# Patient Record
Sex: MALE | Race: WHITE | NOT HISPANIC OR LATINO | Employment: FULL TIME | RURAL
[De-identification: names, ages, dates, MRNs, and addresses within clinical notes are randomized per-mention and may not be internally consistent; named-entity substitution may affect disease eponyms.]

---

## 2022-03-24 ENCOUNTER — OFFICE VISIT (OUTPATIENT)
Dept: FAMILY MEDICINE CLINIC | Facility: CLINIC | Age: 37
End: 2022-03-24

## 2022-03-24 VITALS
DIASTOLIC BLOOD PRESSURE: 84 MMHG | BODY MASS INDEX: 29.9 KG/M2 | HEART RATE: 78 BPM | WEIGHT: 201.9 LBS | SYSTOLIC BLOOD PRESSURE: 132 MMHG | HEIGHT: 69 IN | OXYGEN SATURATION: 98 %

## 2022-03-24 DIAGNOSIS — K62.5 BRBPR (BRIGHT RED BLOOD PER RECTUM): ICD-10-CM

## 2022-03-24 DIAGNOSIS — R31.9 HEMATURIA, UNSPECIFIED TYPE: ICD-10-CM

## 2022-03-24 DIAGNOSIS — B19.20 HEPATITIS C VIRUS INFECTION WITHOUT HEPATIC COMA, UNSPECIFIED CHRONICITY: ICD-10-CM

## 2022-03-24 DIAGNOSIS — Z00.00 ANNUAL PHYSICAL EXAM: Primary | ICD-10-CM

## 2022-03-24 DIAGNOSIS — Z13.220 SCREENING CHOLESTEROL LEVEL: ICD-10-CM

## 2022-03-24 LAB
BILIRUB BLD-MCNC: NEGATIVE MG/DL
CLARITY, POC: CLEAR
COLOR UR: YELLOW
EXPIRATION DATE: ABNORMAL
GLUCOSE UR STRIP-MCNC: NEGATIVE MG/DL
KETONES UR QL: NEGATIVE
LEUKOCYTE EST, POC: NEGATIVE
Lab: ABNORMAL
NITRITE UR-MCNC: NEGATIVE MG/ML
PH UR: 5.5 [PH] (ref 5–8)
PROT UR STRIP-MCNC: NEGATIVE MG/DL
RBC # UR STRIP: ABNORMAL /UL
SP GR UR: 1.03 (ref 1–1.03)
UROBILINOGEN UR QL: NORMAL

## 2022-03-24 PROCEDURE — 3008F BODY MASS INDEX DOCD: CPT | Performed by: NURSE PRACTITIONER

## 2022-03-24 PROCEDURE — 2014F MENTAL STATUS ASSESS: CPT | Performed by: NURSE PRACTITIONER

## 2022-03-24 PROCEDURE — 99385 PREV VISIT NEW AGE 18-39: CPT | Performed by: NURSE PRACTITIONER

## 2022-03-24 PROCEDURE — 81003 URINALYSIS AUTO W/O SCOPE: CPT | Performed by: NURSE PRACTITIONER

## 2022-03-24 RX ORDER — BUPRENORPHINE HYDROCHLORIDE AND NALOXONE HYDROCHLORIDE DIHYDRATE 8; 2 MG/1; MG/1
TABLET SUBLINGUAL
COMMUNITY
Start: 2022-03-17 | End: 2023-04-06

## 2022-03-24 NOTE — PROGRESS NOTES
"Chief Complaint  HFU    Subjective          Brandon Maciel presents to [facility]  Annual exam:   -Is fasting.  Smokes 1.5 packs/day.  No alcohol use.  Past medical history of IV drug use and street drugs.  Currently on Suboxone and sees his Suboxone specialist every 2 weeks.  Does not really watch his diet.  Walks daily.  States no real past medical history.  No dizziness no passing out no headache no chest pain no chest pressure no shortness of breath no trouble breathing no urinary or bowel issues.    States he has been clean and sober for 19 months.  States he finally decided to become clean when he had an episode where he OD and had received 7 rounds of Narcan.    States diagnosed with hep C in 2020 and has not received treatment for it.  Needs referral to specialist for evaluation.    HFU: Went to [facility] member cells on 3/14/2022 due to bright red blood per rectum for 3 days only when having a bowel movement.  Only noticed blood on toilet paper.  CBC and CMP in the hospital were unremarkable occult blood test came back negative.  Likely hemorrhoid.  States he has not had any more episodes in the past 1 to 2 days.  Would like referral to GI for further evaluation.  No pain.  No constipation.  No abdominal pain.  No nausea vomiting diarrhea.      Objective   Vital Signs:   /84   Pulse 78   Ht 175.3 cm (69\")   Wt 91.6 kg (201 lb 14.4 oz)   SpO2 98%   BMI 29.82 kg/m²     Body mass index is 29.82 kg/m².    Review of Systems   Constitutional: Negative.    HENT: Negative.    Eyes: Negative.    Respiratory: Negative.    Cardiovascular: Negative.    Gastrointestinal: Negative.    Genitourinary: Negative for decreased urine volume, difficulty urinating, dysuria, frequency, hematuria, nocturia, testicular pain and urgency.   Musculoskeletal: Negative.    Skin: Negative for color change, rash, wound and bruise.   Neurological: Negative.    Hematological: Negative.  "   Psychiatric/Behavioral: Negative.        Past History:  Medical History: has no past medical history on file.   Surgical History: has no past surgical history on file.   Family History: family history is not on file.   Social History: reports that he has been smoking cigarettes. He has a 22.50 pack-year smoking history. He has never used smokeless tobacco.    PHQ-2 Depression Screening  Little interest or pleasure in doing things? 3-->nearly every day   Feeling down, depressed, or hopeless? 0-->not at all   PHQ-2 Total Score 3        PHQ-9 Depression Screening  Little interest or pleasure in doing things? 3-->nearly every day   Feeling down, depressed, or hopeless? 0-->not at all   Trouble falling or staying asleep, or sleeping too much? 0-->not at all   Feeling tired or having little energy? 0-->not at all   Poor appetite or overeating? 0-->not at all   Feeling bad about yourself - or that you are a failure or have let yourself or your family down? 0-->not at all   Trouble concentrating on things, such as reading the newspaper or watching television? 0-->not at all   Moving or speaking so slowly that other people could have noticed? Or the opposite - being so fidgety or restless that you have been moving around a lot more than usual? 0-->not at all   Thoughts that you would be better off dead, or of hurting yourself in some way? 0-->not at all   PHQ-9 Total Score 3   If you checked off any problems, how difficult have these problems made it for you to do your work, take care of things at home, or get along with other people? somewhat difficult     PHQ-9 Total Score: 3        Current Outpatient Medications:   •  buprenorphine-naloxone (SUBOXONE) 8-2 MG per SL tablet, , Disp: , Rfl:    (Not in a hospital admission)     Allergies: Patient has no known allergies.    Physical Exam  Constitutional:       Appearance: Normal appearance. He is normal weight.   HENT:      Head: Normocephalic.      Right Ear: Tympanic  membrane normal.      Left Ear: Tympanic membrane normal.      Nose: Nose normal.      Mouth/Throat:      Mouth: Mucous membranes are moist.      Pharynx: Oropharynx is clear.   Eyes:      Extraocular Movements: Extraocular movements intact.      Conjunctiva/sclera: Conjunctivae normal.      Pupils: Pupils are equal, round, and reactive to light.   Cardiovascular:      Rate and Rhythm: Normal rate and regular rhythm.      Heart sounds: Normal heart sounds.   Pulmonary:      Effort: Pulmonary effort is normal.      Breath sounds: Normal breath sounds.   Abdominal:      General: Abdomen is flat. Bowel sounds are normal.      Palpations: Abdomen is soft.   Genitourinary:     Comments: Denied exam  Musculoskeletal:         General: Normal range of motion.      Cervical back: Normal range of motion.   Skin:     General: Skin is warm.      Coloration: Skin is not jaundiced.   Neurological:      General: No focal deficit present.      Mental Status: He is alert and oriented to person, place, and time. Mental status is at baseline.   Psychiatric:         Attention and Perception: Attention and perception normal.         Mood and Affect: Mood normal.         Speech: Speech normal.         Behavior: Behavior normal. Behavior is cooperative.         Thought Content: Thought content normal.         Cognition and Memory: Cognition normal.         Judgment: Judgment normal.          Result Review :                   Assessment and Plan    Diagnoses and all orders for this visit:    1. Annual physical exam (Primary)  Assessment & Plan:  Fasting labs drawn, UA completed and showed blood, culture sent, call in 1-2 days for results. Return in 1-2 weeks for repeat UA.  Proper diet and exercise plan discussed and encouraged.  Follow-up with GI regarding blood per rectum and hepatitis C.  Education provided.  He will return to clinic or ED if bleeding occurs again.  Return to clinic or ED with any issues or concerns.    Orders:  -      TSH Rfx On Abnormal To Free T4; Future  -     POC Urinalysis Dipstick, Automated  -     TSH Rfx On Abnormal To Free T4    2. Screening cholesterol level  -     Lipid Panel; Future  -     Lipid Panel    3. BRBPR (bright red blood per rectum)  -     Ambulatory Referral to Gastroenterology    4. Hepatitis C virus infection without hepatic coma, unspecified chronicity  -     Ambulatory Referral to Gastroenterology            Follow Up   Return in about 3 months (around 6/24/2022).  Patient was given instructions and counseling regarding his condition or for health maintenance advice. Please see specific information pulled into the AVS if appropriate.     DONAVAN Pham

## 2022-03-24 NOTE — ASSESSMENT & PLAN NOTE
Fasting labs drawn, UA completed and showed blood, culture sent, call in 1-2 days for results. Return in 1-2 weeks for repeat UA.  Proper diet and exercise plan discussed and encouraged.  Follow-up with GI regarding blood per rectum and hepatitis C.  Education provided.  He will return to clinic or ED if bleeding occurs again.  Return to clinic or ED with any issues or concerns.

## 2022-03-25 LAB
CHOLEST SERPL-MCNC: 168 MG/DL (ref 100–199)
HDLC SERPL-MCNC: 50 MG/DL
LDLC SERPL CALC-MCNC: 87 MG/DL (ref 0–99)
TRIGL SERPL-MCNC: 181 MG/DL (ref 0–149)
VLDLC SERPL CALC-MCNC: 31 MG/DL (ref 5–40)

## 2022-03-26 LAB
BACTERIA UR CULT: NORMAL
BACTERIA UR CULT: NORMAL

## 2022-03-28 ENCOUNTER — TELEPHONE (OUTPATIENT)
Dept: FAMILY MEDICINE CLINIC | Facility: CLINIC | Age: 37
End: 2022-03-28

## 2022-03-28 NOTE — TELEPHONE ENCOUNTER
Please let patient know that his urine culture came back contaminated.  Can leave another urine sample if having any urinary issues.  Thanks

## 2022-03-29 ENCOUNTER — TELEPHONE (OUTPATIENT)
Dept: GASTROENTEROLOGY | Facility: CLINIC | Age: 37
End: 2022-03-29

## 2022-04-01 DIAGNOSIS — Z12.11 SCREENING FOR COLON CANCER: Primary | ICD-10-CM

## 2022-04-05 ENCOUNTER — OUTSIDE FACILITY SERVICE (OUTPATIENT)
Dept: GASTROENTEROLOGY | Facility: CLINIC | Age: 37
End: 2022-04-05

## 2022-04-05 PROCEDURE — 45378 DIAGNOSTIC COLONOSCOPY: CPT | Performed by: INTERNAL MEDICINE

## 2022-04-21 ENCOUNTER — TELEPHONE (OUTPATIENT)
Dept: FAMILY MEDICINE CLINIC | Facility: CLINIC | Age: 37
End: 2022-04-21

## 2022-04-21 NOTE — TELEPHONE ENCOUNTER
TSH was not collected when having labs drawn and its been to long to add on? Sent to Bradly to let him know so he can decide what to do.

## 2022-04-21 NOTE — TELEPHONE ENCOUNTER
Please let patient know that the lab was unable to run the TSH on him if he wants to come back by to have TSH level redrawn.  Thanks

## 2022-04-26 ENCOUNTER — LAB (OUTPATIENT)
Dept: FAMILY MEDICINE CLINIC | Facility: CLINIC | Age: 37
End: 2022-04-26

## 2022-04-26 PROCEDURE — 36415 COLL VENOUS BLD VENIPUNCTURE: CPT | Performed by: NURSE PRACTITIONER

## 2022-04-27 DIAGNOSIS — R79.89 LOW TSH LEVEL: Primary | ICD-10-CM

## 2022-04-27 LAB
T4 FREE SERPL-MCNC: 1.29 NG/DL (ref 0.82–1.77)
TSH SERPL DL<=0.005 MIU/L-ACNC: 0.43 UIU/ML (ref 0.45–4.5)

## 2022-04-28 ENCOUNTER — LAB (OUTPATIENT)
Dept: LAB | Facility: HOSPITAL | Age: 37
End: 2022-04-28

## 2022-04-28 ENCOUNTER — OFFICE VISIT (OUTPATIENT)
Dept: GASTROENTEROLOGY | Facility: CLINIC | Age: 37
End: 2022-04-28

## 2022-04-28 VITALS
DIASTOLIC BLOOD PRESSURE: 80 MMHG | TEMPERATURE: 97.1 F | WEIGHT: 200 LBS | HEIGHT: 69 IN | OXYGEN SATURATION: 95 % | SYSTOLIC BLOOD PRESSURE: 120 MMHG | HEART RATE: 75 BPM | BODY MASS INDEX: 29.62 KG/M2

## 2022-04-28 DIAGNOSIS — B18.2 CHRONIC HEPATITIS C WITHOUT HEPATIC COMA: Primary | ICD-10-CM

## 2022-04-28 DIAGNOSIS — B18.2 CHRONIC HEPATITIS C WITHOUT HEPATIC COMA: ICD-10-CM

## 2022-04-28 LAB
ALBUMIN SERPL-MCNC: 4.4 G/DL (ref 3.5–5.2)
ALBUMIN/GLOB SERPL: 2 G/DL
ALP SERPL-CCNC: 76 U/L (ref 39–117)
ALT SERPL W P-5'-P-CCNC: 40 U/L (ref 1–41)
AMPHET+METHAMPHET UR QL: NEGATIVE
AMPHETAMINES UR QL: NEGATIVE
ANION GAP SERPL CALCULATED.3IONS-SCNC: 14.3 MMOL/L (ref 5–15)
AST SERPL-CCNC: 29 U/L (ref 1–40)
BARBITURATES UR QL SCN: NEGATIVE
BASOPHILS # BLD AUTO: 0.03 10*3/MM3 (ref 0–0.2)
BASOPHILS NFR BLD AUTO: 0.4 % (ref 0–1.5)
BENZODIAZ UR QL SCN: NEGATIVE
BILIRUB SERPL-MCNC: 0.2 MG/DL (ref 0–1.2)
BUN SERPL-MCNC: 11 MG/DL (ref 6–20)
BUN/CREAT SERPL: 13.1 (ref 7–25)
BUPRENORPHINE SERPL-MCNC: POSITIVE NG/ML
CALCIUM SPEC-SCNC: 9.1 MG/DL (ref 8.6–10.5)
CANNABINOIDS SERPL QL: POSITIVE
CHLORIDE SERPL-SCNC: 105 MMOL/L (ref 98–107)
CO2 SERPL-SCNC: 22.7 MMOL/L (ref 22–29)
COCAINE UR QL: NEGATIVE
CREAT SERPL-MCNC: 0.84 MG/DL (ref 0.76–1.27)
DEPRECATED RDW RBC AUTO: 40.1 FL (ref 37–54)
EGFRCR SERPLBLD CKD-EPI 2021: 116.6 ML/MIN/1.73
EOSINOPHIL # BLD AUTO: 0.42 10*3/MM3 (ref 0–0.4)
EOSINOPHIL NFR BLD AUTO: 5.4 % (ref 0.3–6.2)
ERYTHROCYTE [DISTWIDTH] IN BLOOD BY AUTOMATED COUNT: 12.2 % (ref 12.3–15.4)
GLOBULIN UR ELPH-MCNC: 2.2 GM/DL
GLUCOSE SERPL-MCNC: 96 MG/DL (ref 65–99)
HBV SURFACE AG SERPL QL IA: NORMAL
HCT VFR BLD AUTO: 44.9 % (ref 37.5–51)
HCV AB SER DONR QL: REACTIVE
HGB BLD-MCNC: 15.4 G/DL (ref 13–17.7)
HIV1+2 AB SER QL: NORMAL
IMM GRANULOCYTES # BLD AUTO: 0.01 10*3/MM3 (ref 0–0.05)
IMM GRANULOCYTES NFR BLD AUTO: 0.1 % (ref 0–0.5)
INR PPP: 0.99 (ref 0.84–1.13)
LYMPHOCYTES # BLD AUTO: 2.58 10*3/MM3 (ref 0.7–3.1)
LYMPHOCYTES NFR BLD AUTO: 33.1 % (ref 19.6–45.3)
MCH RBC QN AUTO: 31 PG (ref 26.6–33)
MCHC RBC AUTO-ENTMCNC: 34.3 G/DL (ref 31.5–35.7)
MCV RBC AUTO: 90.3 FL (ref 79–97)
METHADONE UR QL SCN: NEGATIVE
MONOCYTES # BLD AUTO: 0.68 10*3/MM3 (ref 0.1–0.9)
MONOCYTES NFR BLD AUTO: 8.7 % (ref 5–12)
NEUTROPHILS NFR BLD AUTO: 4.08 10*3/MM3 (ref 1.7–7)
NEUTROPHILS NFR BLD AUTO: 52.3 % (ref 42.7–76)
NRBC BLD AUTO-RTO: 0 /100 WBC (ref 0–0.2)
OPIATES UR QL: NEGATIVE
OXYCODONE UR QL SCN: NEGATIVE
PCP UR QL SCN: NEGATIVE
PLATELET # BLD AUTO: 220 10*3/MM3 (ref 140–450)
PMV BLD AUTO: 11.9 FL (ref 6–12)
POTASSIUM SERPL-SCNC: 4.1 MMOL/L (ref 3.5–5.2)
PROPOXYPH UR QL: NEGATIVE
PROT SERPL-MCNC: 6.6 G/DL (ref 6–8.5)
PROTHROMBIN TIME: 13 SECONDS (ref 11.4–14.4)
RBC # BLD AUTO: 4.97 10*6/MM3 (ref 4.14–5.8)
SODIUM SERPL-SCNC: 142 MMOL/L (ref 136–145)
TRICYCLICS UR QL SCN: NEGATIVE
WBC NRBC COR # BLD: 7.8 10*3/MM3 (ref 3.4–10.8)

## 2022-04-28 PROCEDURE — 80053 COMPREHEN METABOLIC PANEL: CPT

## 2022-04-28 PROCEDURE — 36415 COLL VENOUS BLD VENIPUNCTURE: CPT

## 2022-04-28 PROCEDURE — 87340 HEPATITIS B SURFACE AG IA: CPT

## 2022-04-28 PROCEDURE — 85610 PROTHROMBIN TIME: CPT

## 2022-04-28 PROCEDURE — 85025 COMPLETE CBC W/AUTO DIFF WBC: CPT

## 2022-04-28 PROCEDURE — 87522 HEPATITIS C REVRS TRNSCRPJ: CPT

## 2022-04-28 PROCEDURE — 86803 HEPATITIS C AB TEST: CPT

## 2022-04-28 PROCEDURE — 80306 DRUG TEST PRSMV INSTRMNT: CPT

## 2022-04-28 PROCEDURE — 99214 OFFICE O/P EST MOD 30 MIN: CPT | Performed by: NURSE PRACTITIONER

## 2022-04-28 PROCEDURE — 86704 HEP B CORE ANTIBODY TOTAL: CPT

## 2022-04-28 PROCEDURE — 81596 NFCT DS CHRNC HCV 6 ASSAYS: CPT

## 2022-04-28 PROCEDURE — 86708 HEPATITIS A ANTIBODY: CPT

## 2022-04-28 PROCEDURE — G0432 EIA HIV-1/HIV-2 SCREEN: HCPCS

## 2022-04-28 PROCEDURE — 87902 NFCT AGT GNTYP ALYS HEP C: CPT

## 2022-04-28 RX ORDER — POLYETHYLENE GLYCOL-3350 AND ELECTROLYTES WITH FLAVOR PACK 240; 5.84; 2.98; 6.72; 22.72 G/278.26G; G/278.26G; G/278.26G; G/278.26G; G/278.26G
POWDER, FOR SOLUTION ORAL
COMMUNITY
Start: 2022-04-01 | End: 2022-05-19

## 2022-04-28 NOTE — PROGRESS NOTES
New Patient Consultation     Patient Name: Brandon Maciel  : 1985   MRN: [id number]     Chief Complaint:    Chief Complaint   Patient presents with   • Abnormal Lab     HEP C       History of Present Illness: Brandon Maciel is a 36 y.o. male who is here today for a Gastroenterology Consultation for Hepatitis C.    Dx with Hep C in . Currently in a suboxone clinic.  Hx of Iv drug use (heroin and meth).  Sober since .  Has been to longterm. There is a history of unprotected intercourse.He has never had treatment.  There is no history of liver related hospitalization.  He does not recall ever having Hepatitis A or B.  He denies jaundice, joint swelling, fatigue, easy bruising/ bleeding, confusion/  There is no family hx of liver disease  He does not drink alcohol.    Subjective      Review of Systems:   Review of Systems   Constitutional: Negative for fatigue and unexpected weight loss.   Cardiovascular: Negative for leg swelling.   Gastrointestinal: Negative for abdominal distention.   Musculoskeletal: Negative for joint swelling.   Skin: Negative for color change, pallor and rash.   Neurological: Negative for confusion.       Past Medical History:   Past Medical History:   Diagnosis Date   • Infectious viral hepatitis     Found out at rehab treatment that i have Hepatitis C and its active       Past Surgical History:   Past Surgical History:   Procedure Laterality Date   • COLONOSCOPY  2022       Family History:   Family History   Problem Relation Age of Onset   • Colon cancer Neg Hx    • Colon polyps Neg Hx    • Esophageal cancer Neg Hx        Social History:   Social History     Socioeconomic History   • Marital status: Single   Tobacco Use   • Smoking status: Current Every Day Smoker     Packs/day: 1.50     Years: 15.00     Pack years: 22.50     Types: Cigarettes   • Smokeless tobacco: Never Used   Vaping Use   • Vaping Use: Never used   Substance and Sexual Activity  "  • Alcohol use: Never   • Drug use: Yes     Frequency: 7.0 times per week     Types: Heroin, Hydrocodone, Marijuana, Methamphetamines   • Sexual activity: Yes     Partners: Female     Birth control/protection: Condom, I.U.D.       Alcohol/Tobacco History:   Social History     Substance and Sexual Activity   Alcohol Use Never     Social History     Tobacco Use   Smoking Status Current Every Day Smoker   • Packs/day: 1.50   • Years: 15.00   • Pack years: 22.50   • Types: Cigarettes   Smokeless Tobacco Never Used       Medications:     Current Outpatient Medications:   •  buprenorphine-naloxone (SUBOXONE) 8-2 MG per SL tablet, , Disp: , Rfl:   •  GaviLyte-C 240 g solution, , Disp: , Rfl:     Allergies:   No Known Allergies    Objective     Physical Exam:  Vital Signs:   Vitals:    04/28/22 1229   BP: 120/80   BP Location: Left arm   Patient Position: Sitting   Cuff Size: Adult   Pulse: 75   Temp: 97.1 °F (36.2 °C)   TempSrc: Temporal   SpO2: 95%   Weight: 90.7 kg (200 lb)   Height: 175.3 cm (69.02\")     Body mass index is 29.52 kg/m².     Physical Exam  Vitals and nursing note reviewed.   Constitutional:       General: He is not in acute distress.     Appearance: He is well-developed. He is not diaphoretic.   Eyes:      General: No scleral icterus.     Conjunctiva/sclera: Conjunctivae normal.   Neck:      Thyroid: No thyromegaly.   Cardiovascular:      Rate and Rhythm: Normal rate and regular rhythm.   Pulmonary:      Effort: Pulmonary effort is normal.      Breath sounds: Normal breath sounds.   Abdominal:      General: Bowel sounds are normal. There is no distension.      Palpations: Abdomen is soft. There is no hepatomegaly.      Tenderness: There is no abdominal tenderness. There is no guarding or rebound.      Hernia: No hernia is present.   Musculoskeletal:      Cervical back: Neck supple.      Right lower leg: No edema.      Left lower leg: No edema.   Skin:     General: Skin is warm and dry.      Capillary " Refill: Capillary refill takes 2 to 3 seconds.      Coloration: Skin is not jaundiced or pale.      Findings: No bruising or petechiae.      Nails: There is no clubbing.   Neurological:      Mental Status: He is alert and oriented to person, place, and time.   Psychiatric:         Behavior: Behavior normal.         Thought Content: Thought content normal.         Judgment: Judgment normal.         Assessment / Plan      Assessment/Plan:   Diagnoses and all orders for this visit:    1. Chronic hepatitis C without hepatic coma (HCC) (Primary)  -     HCV FibroSURE; Future  -     Urine Drug Screen - Urine, Clean Catch; Future  -     Hepatitis A Antibody, Total; Future  -     Hepatitis B Core Antibody, Total; Future  -     Hepatitis B Surface Antigen; Future  -     Protime-INR; Future  -     Comprehensive Metabolic Panel; Future  -     CBC & Differential; Future  -     HIV-1 & HIV-2 Antibodies; Future  -     Hepatitis C Genotype; Future  -     Hepatitis C Antibody; Future  -     Hepatitis C RNA, Quantitative, PCR (graph); Future     -Outside records reviewed  -diagnosed 2020  -HCV risk factors as discussed in HPI  -treatment naive  -Discussed the nature, workup, and transmission of Hepatitis C virus; advised to avoid sharing toothbrush, dental and shaving equipment, nail clippers  -Recommend vaccination series for Hepatitis A and B.  -Labs per HCV consultation protocol- will plan to initiate epclusa, 12 week course once labs result. Medication directions given, common side effects discussed.         Follow Up:   Return in about 4 months (around 8/28/2022).    Plan of care reviewed with the patient at the conclusion of today's visit.  Education was provided regarding diagnosis, management, and any prescribed or recommended OTC medications.  Patient verbalized understanding of and agreement with management plan.         DONAVAN Wright  [facility]

## 2022-04-29 LAB
HAV AB SER QL IA: NEGATIVE
HBV CORE AB SERPL QL IA: NEGATIVE

## 2022-05-01 LAB
A2 MACROGLOB SERPL-MCNC: 123 MG/DL (ref 110–276)
ALT SERPL W P-5'-P-CCNC: 43 IU/L (ref 0–55)
APO A-I SERPL-MCNC: 145 MG/DL (ref 101–178)
BILIRUB SERPL-MCNC: 0.2 MG/DL (ref 0–1.2)
FIBROSIS SCORING:: ABNORMAL
FIBROSIS STAGE SERPL QL: ABNORMAL
GGT SERPL-CCNC: 33 IU/L (ref 0–65)
HAPTOGLOB SERPL-MCNC: 60 MG/DL (ref 17–317)
HCV AB SER QL: ABNORMAL
LABORATORY COMMENT REPORT: ABNORMAL
LIVER FIBR SCORE SERPL CALC.FIBROSURE: 0.06 (ref 0–0.21)
NECROINFLAMM ACTIVITY SCORING:: ABNORMAL
NECROINFLAMMATORY ACT GRADE SERPL QL: ABNORMAL
NECROINFLAMMATORY ACT SCORE SERPL: 0.18 (ref 0–0.17)
SERVICE CMNT-IMP: ABNORMAL

## 2022-05-02 LAB
HCV GENTYP SERPL NAA+PROBE: 3
LABORATORY COMMENT REPORT: NORMAL

## 2022-05-09 LAB
HCV RNA SERPL NAA+PROBE-ACNC: NORMAL IU/ML
HCV RNA SERPL NAA+PROBE-LOG IU: 5.97 LOG10 IU/ML
TEST INFORMATION: NORMAL

## 2022-05-10 RX ORDER — VELPATASVIR AND SOFOSBUVIR 100; 400 MG/1; MG/1
1 TABLET, FILM COATED ORAL DAILY
Qty: 30 TABLET | Refills: 2 | Status: SHIPPED | OUTPATIENT
Start: 2022-05-10 | End: 2022-05-19

## 2022-05-12 ENCOUNTER — TELEPHONE (OUTPATIENT)
Dept: GASTROENTEROLOGY | Facility: CLINIC | Age: 37
End: 2022-05-12

## 2022-05-16 NOTE — TELEPHONE ENCOUNTER
Gerald called stating that [facility] is showing a different insurance for primary - talk to patient gave him gerald ph# [phone]

## 2022-05-19 ENCOUNTER — LAB (OUTPATIENT)
Dept: LAB | Facility: HOSPITAL | Age: 37
End: 2022-05-19

## 2022-05-19 ENCOUNTER — OFFICE VISIT (OUTPATIENT)
Dept: ENDOCRINOLOGY | Facility: CLINIC | Age: 37
End: 2022-05-19

## 2022-05-19 VITALS
BODY MASS INDEX: 28.88 KG/M2 | HEIGHT: 69 IN | OXYGEN SATURATION: 97 % | SYSTOLIC BLOOD PRESSURE: 120 MMHG | HEART RATE: 80 BPM | WEIGHT: 195 LBS | DIASTOLIC BLOOD PRESSURE: 80 MMHG

## 2022-05-19 DIAGNOSIS — E05.90 SUBCLINICAL HYPERTHYROIDISM: Primary | ICD-10-CM

## 2022-05-19 LAB
T3FREE SERPL-MCNC: 4.18 PG/ML (ref 2–4.4)
T4 FREE SERPL-MCNC: 1.26 NG/DL (ref 0.93–1.7)
TSH SERPL DL<=0.05 MIU/L-ACNC: 0.63 UIU/ML (ref 0.27–4.2)

## 2022-05-19 PROCEDURE — 99203 OFFICE O/P NEW LOW 30 MIN: CPT | Performed by: INTERNAL MEDICINE

## 2022-05-19 PROCEDURE — 84443 ASSAY THYROID STIM HORMONE: CPT | Performed by: INTERNAL MEDICINE

## 2022-05-19 PROCEDURE — 84445 ASSAY OF TSI GLOBULIN: CPT | Performed by: INTERNAL MEDICINE

## 2022-05-19 PROCEDURE — 83520 IMMUNOASSAY QUANT NOS NONAB: CPT | Performed by: INTERNAL MEDICINE

## 2022-05-19 PROCEDURE — 84481 FREE ASSAY (FT-3): CPT | Performed by: INTERNAL MEDICINE

## 2022-05-19 PROCEDURE — 84439 ASSAY OF FREE THYROXINE: CPT | Performed by: INTERNAL MEDICINE

## 2022-05-19 NOTE — TELEPHONE ENCOUNTER
"PER BIOPLUS --  We are still receiving a rejection from the patient's insurance stating to \"SUBMIT BILL TO OTHER PROCESSOR OR PRIMARY PAYOR\". We have spoken with the patient and advised that he will need to reach back out to his plan to have his insurance plan updated. His [employer] plan's phone number is [phone] should you need it.    LVM FOR PATIENT TO CALL BACK"

## 2022-05-19 NOTE — PROGRESS NOTES
Chief Complaint   Patient presents with   • Thyroid Problem     Low TSH        New patient who is being seen in consultation regarding low TSH at the request of Bradly Lopez AP*     HPI   Brandon Maciel is a 36 y.o. male who presents for evaluation of low TSH.     Patient presents today for evaluation of low TSH which was diagnosed on routine labs when patient presented to establish care with a new primary care provider.  Patient denies any known history of thyroid dysfunction.  He is not currently taking any medication to alter thyroid function.  He reports that he generally feels well and denies any acute illness at the time of initial laboratory evaluation.     Patient denies palpiltations, anxiety.  Patient denies changes in bowel habits. Patient reports some constipation which he attributes to Suboxone therapy  Patient denies heat or cold intolerance.  Patient denies changes in weight.  Patient denies hair, skin or nail changes.  Patient denies tremor.  Patient reports fluctuation in energy level.    Patient denies history of previous head/neck radiation.  Patient denies history of recent iodine exposure.  Patient denies taking OTC supplements such as biotin.  Patient denies personal or family history of thyroid cancer.     Past Medical History:   Diagnosis Date   • Infectious viral hepatitis 2020    Found out at rehab treatment that i have Hepatitis C and its active   • Testosterone deficiency      Past Surgical History:   Procedure Laterality Date   • COLONOSCOPY  April 5 2022      Family History   Problem Relation Age of Onset   • Alcohol abuse Mother    • Alcohol abuse Father    • Heart disease Maternal Uncle    • Stroke Maternal Uncle    • Colon cancer Neg Hx    • Colon polyps Neg Hx    • Esophageal cancer Neg Hx       Social History     Socioeconomic History   • Marital status: Single   Tobacco Use   • Smoking status: Current Every Day Smoker     Packs/day: 1.50     Years: 15.00     Pack  "years: 22.50     Types: Cigarettes   • Smokeless tobacco: Never Used   Vaping Use   • Vaping Use: Never used   Substance and Sexual Activity   • Alcohol use: Never   • Drug use: Yes     Frequency: 7.0 times per week     Types: Heroin, Hydrocodone, Marijuana, Methamphetamines   • Sexual activity: Yes     Partners: Female     Birth control/protection: Condom, I.U.D.      No Known Allergies   Current Outpatient Medications on File Prior to Visit   Medication Sig Dispense Refill   • buprenorphine-naloxone (SUBOXONE) 8-2 MG per SL tablet      • [DISCONTINUED] GaviLyte-C 240 g solution      • [DISCONTINUED] Sofosbuvir-Velpatasvir 400-100 MG tablet Take 1 tablet by mouth Daily. For 12 weeks 30 tablet 2     No current facility-administered medications on file prior to visit.        Review of Systems   Constitutional: Positive for diaphoresis. Negative for unexpected weight gain and unexpected weight loss.   HENT: Negative for trouble swallowing and voice change.    Eyes: Negative for pain and visual disturbance.   Respiratory: Negative for cough and shortness of breath.    Cardiovascular: Negative for palpitations and leg swelling.   Gastrointestinal: Positive for abdominal pain and constipation.   Endocrine: Negative for cold intolerance and heat intolerance.   Musculoskeletal: Positive for back pain. Negative for myalgias.   Skin: Negative for dry skin and rash.   Neurological: Negative for tremors and headache.   Psychiatric/Behavioral: Negative for sleep disturbance. The patient is not nervous/anxious.         Vitals:    05/19/22 0825   BP: 120/80   BP Location: Right arm   Patient Position: Sitting   Cuff Size: Adult   Pulse: 80   SpO2: 97%   Weight: 88.5 kg (195 lb)   Height: 175.3 cm (69\")   Body mass index is 28.8 kg/m².     Physical Exam  Vitals reviewed.   Constitutional:       General: He is not in acute distress.     Appearance: Normal appearance.   HENT:      Head: Normocephalic and atraumatic.      Right Ear: " Hearing normal.      Left Ear: Hearing normal.      Nose: Nose normal.   Eyes:      General: Lids are normal.      Conjunctiva/sclera: Conjunctivae normal.   Neck:      Thyroid: No thyromegaly or thyroid tenderness.   Cardiovascular:      Rate and Rhythm: Normal rate and regular rhythm.      Heart sounds: No murmur heard.  Pulmonary:      Effort: Pulmonary effort is normal.      Breath sounds: Normal breath sounds and air entry.   Abdominal:      General: Bowel sounds are normal.      Palpations: Abdomen is soft.      Tenderness: There is no abdominal tenderness.   Lymphadenopathy:      Head:      Right side of head: No submandibular adenopathy.      Left side of head: No submandibular adenopathy.      Cervical: No cervical adenopathy.   Skin:     General: Skin is warm and dry.      Findings: No rash.   Neurological:      General: No focal deficit present.      Mental Status: He is alert.      Deep Tendon Reflexes: Reflexes are normal and symmetric.   Psychiatric:         Mood and Affect: Mood and affect normal.         Behavior: Behavior is cooperative.          Labs/Imaging      Latest Reference Range & Units 04/26/22 10:01   TSH Baseline 0.450 - 4.500 uIU/mL 0.428 (L)   Free T4 0.82 - 1.77 ng/dL 1.29   (L): Data is abnormally low    Assessment and Plan    Diagnoses and all orders for this visit:    1. Subclinical hyperthyroidism (Primary)  -Patient noted to have slightly low TSH, normal free T4 on labs in April 2022 with new PCP.  -Patient is clinically euthyroid  -Reviewed potential etiologies of patient's thyroid hormone abnormality.  Discussed that such abnormalities may be transient.  Plan to repeat thyroid function testing today, will also obtain thyroid antibodies.  -Discussed threshold for treatment of subclinical hyperthyroidism in patients that are asymptomatic.  - Potential risks of untreated hyperthyroidism were discussed with patient.  -Reviewed use of methimazole in the treatment of hyperthyroidism.   The risks and benefits of methimazole and when to seek care were reviewed with the patient. Potential risks include, but are not limited to, hepatic dysfunction, agranulocytosis, rash, vasculitis, iatrogenic hypothyroidism.  -Determine next steps after review of labs  -     TSH  -     T4, Free  -     T3, Free  -     Thyroid Stimulating Immunoglobulin  -     Thyrotropin Receptor Antibody         Return in about 4 months (around 9/19/2022) for Next scheduled follow up. The patient was instructed to contact the clinic with any interval questions or concerns.    Jessica Pacheco MD     Dictated Utilizing Dragon Dictation

## 2022-05-21 LAB — TSI SER-ACNC: <0.1 IU/L (ref 0–0.55)

## 2022-05-22 LAB — TSH RECEP AB SER-ACNC: <1.1 IU/L (ref 0–1.75)

## 2022-05-24 DIAGNOSIS — B19.20 HEPATITIS C VIRUS INFECTION WITHOUT HEPATIC COMA, UNSPECIFIED CHRONICITY: Primary | ICD-10-CM

## 2022-05-24 RX ORDER — VELPATASVIR AND SOFOSBUVIR 100; 400 MG/1; MG/1
1 TABLET, FILM COATED ORAL DAILY
Qty: 30 TABLET | Refills: 2 | Status: SHIPPED | OUTPATIENT
Start: 2022-05-24 | End: 2022-08-30

## 2022-08-30 ENCOUNTER — OFFICE VISIT (OUTPATIENT)
Dept: GASTROENTEROLOGY | Facility: CLINIC | Age: 37
End: 2022-08-30

## 2022-08-30 DIAGNOSIS — B18.2 CHRONIC HEPATITIS C WITHOUT HEPATIC COMA: Primary | ICD-10-CM

## 2022-08-30 PROCEDURE — 99213 OFFICE O/P EST LOW 20 MIN: CPT | Performed by: NURSE PRACTITIONER

## 2022-08-30 NOTE — PROGRESS NOTES
Follow Up      Patient Name: Brandon Maciel  : 1985   MRN: [id number]   You have chosen to receive care through a telephone visit. Do you consent to use a telephone visit for your medical care today? Yes   Both parties are located in the state of [place] at the time of the visit.    Chief Complaint:    Chief Complaint   Patient presents with   • Hepatic Disease       History of Present Illness: Brandon Maciel is a 37 y.o. male who is here today for follow up on Hepatitis C.     Brandon is here today to follow-up on hepatitis C.  He was diagnosed with 2020 and underwent treatment this year.  He completed his Epclusa about 2 weeks ago.  He did ultimately miss 2 doses but took them at the end of treatment.  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  He follows precautions to prevent transmission to others.  He has not been vaccinated for hepatitis A or B.    Subjective      Review of Systems:   Review of Systems   Constitutional: Positive for fatigue. Negative for unexpected weight loss.   Cardiovascular: Negative for leg swelling.   Gastrointestinal: Negative for abdominal distention.   Musculoskeletal: Negative for joint swelling.   Skin: Negative for color change, pallor and rash.   Neurological: Negative for confusion.       Medications:     Current Outpatient Medications:   •  buprenorphine-naloxone (SUBOXONE) 8-2 MG per SL tablet, , Disp: , Rfl:     Allergies:   No Known Allergies    Social History:   Social History     Socioeconomic History   • Marital status: Single   Tobacco Use   • Smoking status: Current Every Day Smoker     Packs/day: 1.50     Years: 15.00     Pack years: 22.50     Types: Cigarettes   • Smokeless tobacco: Never Used   Vaping Use   • Vaping Use: Never used   Substance and Sexual Activity   • Alcohol use: Never   • Drug use: Yes     Frequency: 7.0 times per week     Types: Heroin, Hydrocodone, Marijuana, Methamphetamines   • Sexual activity: Yes     Partners: Female     Birth  control/protection: Condom, I.U.D.        Surgical History:   Past Surgical History:   Procedure Laterality Date   • COLONOSCOPY  April 5 2022        Medical History:   Past Medical History:   Diagnosis Date   • Infectious viral hepatitis 2020    Found out at rehab treatment that i have Hepatitis C and its active   • Testosterone deficiency         Objective     Physical Exam:  Vital Signs: There were no vitals filed for this visit.  There is no height or weight on file to calculate BMI.     Physical Exam  Neurological:      Mental Status: He is oriented to person, place, and time.   Psychiatric:         Mood and Affect: Mood normal.         Thought Content: Thought content normal.         Assessment / Plan      Assessment/Plan:   Diagnoses and all orders for this visit:    1. Chronic hepatitis C without hepatic coma (HCC) (Primary)  -     Hepatitis C RNA, Quantitative, PCR (graph); Standing  -     Comprehensive Metabolic Panel; Standing    Status post treatment.  Will obtain labs today to ensure liver enzyme normalization and treatment response.  Continue to follow precautions to prevent transmission to others.  He will repeat these labs again in 3 months to establish SVR.  If negative at that point, he will be considered cured and may follow-up as needed.  He verbalized understanding and denied further questions  Recommend vaccinating for hepatitis A and B.  Recommend lifelong excessive alcohol use avoidance.  Follow Up:   Return if symptoms worsen or fail to improve.    Plan of care reviewed with the patient at the conclusion of today's visit.  Education was provided regarding diagnosis, management, and any prescribed or recommended OTC medications.  Patient verbalized understanding of and agreement with management plan.     Time Statement:   Discussed plan of care in detail with patient today. Patient verbally understands and agrees. I have spent 20 minutes reviewing available diagnostics, obtaining history,  examining the patient, developing a treatment plan, and educating the patient on disease process and plan of care.     DONAVAN Wright  [facility]

## 2022-09-13 ENCOUNTER — LAB (OUTPATIENT)
Dept: FAMILY MEDICINE CLINIC | Facility: CLINIC | Age: 37
End: 2022-09-13

## 2022-09-13 DIAGNOSIS — B18.2 CHRONIC HEPATITIS C WITHOUT HEPATIC COMA: ICD-10-CM

## 2022-09-13 PROCEDURE — 36415 COLL VENOUS BLD VENIPUNCTURE: CPT | Performed by: NURSE PRACTITIONER

## 2022-09-14 LAB
ALBUMIN SERPL-MCNC: 4.6 G/DL (ref 4–5)
ALBUMIN/GLOB SERPL: 2.3 {RATIO} (ref 1.2–2.2)
ALP SERPL-CCNC: 64 IU/L (ref 44–121)
ALT SERPL-CCNC: 11 IU/L (ref 0–44)
AST SERPL-CCNC: 18 IU/L (ref 0–40)
BILIRUB SERPL-MCNC: 0.8 MG/DL (ref 0–1.2)
BUN SERPL-MCNC: 8 MG/DL (ref 6–20)
BUN/CREAT SERPL: 9 (ref 9–20)
CALCIUM SERPL-MCNC: 9.4 MG/DL (ref 8.7–10.2)
CHLORIDE SERPL-SCNC: 106 MMOL/L (ref 96–106)
CO2 SERPL-SCNC: 19 MMOL/L (ref 20–29)
CREAT SERPL-MCNC: 0.91 MG/DL (ref 0.76–1.27)
EGFRCR-CYS SERPLBLD CKD-EPI 2021: 112 ML/MIN/1.73
GLOBULIN SER CALC-MCNC: 2 G/DL (ref 1.5–4.5)
GLUCOSE SERPL-MCNC: 114 MG/DL (ref 65–99)
POTASSIUM SERPL-SCNC: 5 MMOL/L (ref 3.5–5.2)
PROT SERPL-MCNC: 6.6 G/DL (ref 6–8.5)
SODIUM SERPL-SCNC: 143 MMOL/L (ref 134–144)

## 2022-09-17 LAB
HCV RNA SERPL NAA+PROBE-ACNC: NORMAL IU/ML
TEST INFORMATION: NORMAL

## 2022-11-03 ENCOUNTER — LAB (OUTPATIENT)
Dept: FAMILY MEDICINE CLINIC | Facility: CLINIC | Age: 37
End: 2022-11-03

## 2022-11-03 DIAGNOSIS — B18.2 CHRONIC HEPATITIS C WITHOUT HEPATIC COMA: ICD-10-CM

## 2022-11-03 PROCEDURE — 36415 COLL VENOUS BLD VENIPUNCTURE: CPT | Performed by: NURSE PRACTITIONER

## 2022-11-04 LAB
ALBUMIN SERPL-MCNC: 5 G/DL (ref 4–5)
ALBUMIN/GLOB SERPL: 2.8 {RATIO} (ref 1.2–2.2)
ALP SERPL-CCNC: 73 IU/L (ref 44–121)
ALT SERPL-CCNC: 13 IU/L (ref 0–44)
AST SERPL-CCNC: 17 IU/L (ref 0–40)
BILIRUB SERPL-MCNC: 0.4 MG/DL (ref 0–1.2)
BUN SERPL-MCNC: 5 MG/DL (ref 6–20)
BUN/CREAT SERPL: 6 (ref 9–20)
CALCIUM SERPL-MCNC: 9.3 MG/DL (ref 8.7–10.2)
CHLORIDE SERPL-SCNC: 104 MMOL/L (ref 96–106)
CO2 SERPL-SCNC: 23 MMOL/L (ref 20–29)
CREAT SERPL-MCNC: 0.86 MG/DL (ref 0.76–1.27)
EGFRCR SERPLBLD CKD-EPI 2021: 115 ML/MIN/1.73
GLOBULIN SER CALC-MCNC: 1.8 G/DL (ref 1.5–4.5)
GLUCOSE SERPL-MCNC: 125 MG/DL (ref 70–99)
HCV RNA SERPL NAA+PROBE-ACNC: NORMAL IU/ML
POTASSIUM SERPL-SCNC: 4.6 MMOL/L (ref 3.5–5.2)
PROT SERPL-MCNC: 6.8 G/DL (ref 6–8.5)
SODIUM SERPL-SCNC: 144 MMOL/L (ref 134–144)
TEST INFORMATION: NORMAL

## 2023-04-06 ENCOUNTER — OFFICE VISIT (OUTPATIENT)
Dept: FAMILY MEDICINE CLINIC | Facility: CLINIC | Age: 38
End: 2023-04-06
Payer: COMMERCIAL

## 2023-04-06 VITALS
BODY MASS INDEX: 25.96 KG/M2 | HEART RATE: 121 BPM | RESPIRATION RATE: 18 BRPM | SYSTOLIC BLOOD PRESSURE: 140 MMHG | HEIGHT: 69 IN | WEIGHT: 175.25 LBS | DIASTOLIC BLOOD PRESSURE: 90 MMHG

## 2023-04-06 DIAGNOSIS — M54.89 OTHER BACK PAIN, UNSPECIFIED CHRONICITY: Primary | ICD-10-CM

## 2023-04-06 DIAGNOSIS — N30.01 ACUTE CYSTITIS WITH HEMATURIA: ICD-10-CM

## 2023-04-06 DIAGNOSIS — R31.9 HEMATURIA, UNSPECIFIED TYPE: ICD-10-CM

## 2023-04-06 LAB
BILIRUB BLD-MCNC: ABNORMAL MG/DL
CLARITY, POC: ABNORMAL
COLOR UR: ABNORMAL
EXPIRATION DATE: ABNORMAL
GLUCOSE UR STRIP-MCNC: NEGATIVE MG/DL
KETONES UR QL: NEGATIVE
LEUKOCYTE EST, POC: ABNORMAL
Lab: ABNORMAL
NITRITE UR-MCNC: NEGATIVE MG/ML
PH UR: 6.5 [PH] (ref 5–8)
PROT UR STRIP-MCNC: ABNORMAL MG/DL
RBC # UR STRIP: ABNORMAL /UL
SP GR UR: 1.02 (ref 1–1.03)
UROBILINOGEN UR QL: ABNORMAL

## 2023-04-06 RX ORDER — SULFAMETHOXAZOLE AND TRIMETHOPRIM 800; 160 MG/1; MG/1
1 TABLET ORAL 2 TIMES DAILY
Qty: 14 TABLET | Refills: 0 | Status: SHIPPED | OUTPATIENT
Start: 2023-04-06 | End: 2023-04-13

## 2023-04-06 NOTE — PROGRESS NOTES
"Chief Complaint  Back Pain (Patient has had flank pain for the last 2 months.)    Subjective        Brandon Maciel presents to [facility]  History of Present Illness  Patient states that since last August when he finished his treatment for his hepatitis C.  He has noticed some flank pain.  He states in the past 2 months the symptoms have been getting worse.  He states that he has pain that starts on his flank and then goes down to the lower part of his back.  He states that he started drinking only water or Sprite.  He states he has not had any other caffeinated beverages.  He states that the pain is still gotten worse.  He states that he cannot find a comfortable way to sit or stand.  He denies any frequency urgency dysuria or discharge.  He denies any fever he has noticed that his urine has been darker than usual but denies any laurita blood.  He states that he has no history of kidney stones and no one in his family has a history of kidney  Objective   Vital Signs:  /90 (BP Location: Left arm, Patient Position: Sitting, Cuff Size: Adult)   Pulse (!) 121   Resp 18   Ht 175.3 cm (69\")   Wt 79.5 kg (175 lb 4 oz)   BMI 25.88 kg/m²   Estimated body mass index is 25.88 kg/m² as calculated from the following:    Height as of this encounter: 175.3 cm (69\").    Weight as of this encounter: 79.5 kg (175 lb 4 oz).             Physical Exam  Vitals and nursing note reviewed.   Constitutional:       Appearance: Normal appearance. He is normal weight.   HENT:      Head: Normocephalic and atraumatic.   Cardiovascular:      Rate and Rhythm: Normal rate and regular rhythm.      Heart sounds: Normal heart sounds.   Pulmonary:      Effort: Pulmonary effort is normal.      Breath sounds: Normal breath sounds.   Abdominal:      General: Abdomen is flat.      Palpations: Abdomen is soft.       Musculoskeletal:        Back:    Neurological:      General: No focal deficit present.      " Mental Status: He is alert.   Psychiatric:         Mood and Affect: Mood normal.        Result Review :     CBC Auto Differential (04/06/2023 09:37)  Comprehensive metabolic panel (04/06/2023 09:37)  CBC w AUTO Differential (04/06/2023 09:37)  Urine Culture - Urine, Urine, Clean Catch (04/06/2023 09:08)  POCT urinalysis dipstick, automated (04/06/2023 09:06)                 Assessment and Plan   Diagnoses and all orders for this visit:    1.UTI  We will send urinalysis for culture and will place patient on Bactrim.  Suspect that this may in fact be kidney stones and will also get KUB as well as ultrasound of kidneys for further evaluation.  We will also have labs drawn today to rule out other infections and to evaluate kidney function.  2. Other back pain, unspecified chronicity (Primary)  -     POCT urinalysis dipstick, automated    2. Hematuria, unspecified type  -     Urine Culture - Urine, Urine, Clean Catch; Future  -     Urine Culture - Urine, Urine, Clean Catch             Follow Up   No follow-ups on file.  Patient was given instructions and counseling regarding his condition or for health maintenance advice. Please see specific information pulled into the AVS if appropriate.

## 2023-04-07 LAB
ALBUMIN SERPL-MCNC: 4.5 G/DL (ref 4–5)
ALBUMIN/GLOB SERPL: 2.1 {RATIO} (ref 1.2–2.2)
ALP SERPL-CCNC: 72 IU/L (ref 44–121)
ALT SERPL-CCNC: 10 IU/L (ref 0–44)
AST SERPL-CCNC: 14 IU/L (ref 0–40)
BASOPHILS # BLD AUTO: 0.1 X10E3/UL (ref 0–0.2)
BASOPHILS NFR BLD AUTO: 0 %
BILIRUB SERPL-MCNC: 0.4 MG/DL (ref 0–1.2)
BUN SERPL-MCNC: 7 MG/DL (ref 6–20)
BUN/CREAT SERPL: 8 (ref 9–20)
CALCIUM SERPL-MCNC: 9.3 MG/DL (ref 8.7–10.2)
CHLORIDE SERPL-SCNC: 109 MMOL/L (ref 96–106)
CO2 SERPL-SCNC: 24 MMOL/L (ref 20–29)
CREAT SERPL-MCNC: 0.83 MG/DL (ref 0.76–1.27)
EGFRCR SERPLBLD CKD-EPI 2021: 116 ML/MIN/1.73
EOSINOPHIL # BLD AUTO: 0.3 X10E3/UL (ref 0–0.4)
EOSINOPHIL NFR BLD AUTO: 3 %
ERYTHROCYTE [DISTWIDTH] IN BLOOD BY AUTOMATED COUNT: 12.4 % (ref 11.6–15.4)
GLOBULIN SER CALC-MCNC: 2.1 G/DL (ref 1.5–4.5)
GLUCOSE SERPL-MCNC: 97 MG/DL (ref 70–99)
HCT VFR BLD AUTO: 49.1 % (ref 37.5–51)
HGB BLD-MCNC: 16.9 G/DL (ref 13–17.7)
IMM GRANULOCYTES # BLD AUTO: 0 X10E3/UL (ref 0–0.1)
IMM GRANULOCYTES NFR BLD AUTO: 0 %
LYMPHOCYTES # BLD AUTO: 2.1 X10E3/UL (ref 0.7–3.1)
LYMPHOCYTES NFR BLD AUTO: 18 %
MCH RBC QN AUTO: 30.4 PG (ref 26.6–33)
MCHC RBC AUTO-ENTMCNC: 34.4 G/DL (ref 31.5–35.7)
MCV RBC AUTO: 88 FL (ref 79–97)
MONOCYTES # BLD AUTO: 0.7 X10E3/UL (ref 0.1–0.9)
MONOCYTES NFR BLD AUTO: 6 %
NEUTROPHILS # BLD AUTO: 8.6 X10E3/UL (ref 1.4–7)
NEUTROPHILS NFR BLD AUTO: 73 %
PLATELET # BLD AUTO: 266 X10E3/UL (ref 150–450)
POTASSIUM SERPL-SCNC: 4.5 MMOL/L (ref 3.5–5.2)
PROT SERPL-MCNC: 6.6 G/DL (ref 6–8.5)
RBC # BLD AUTO: 5.56 X10E6/UL (ref 4.14–5.8)
SODIUM SERPL-SCNC: 146 MMOL/L (ref 134–144)
WBC # BLD AUTO: 11.9 X10E3/UL (ref 3.4–10.8)

## 2023-04-08 LAB
BACTERIA UR CULT: NO GROWTH
BACTERIA UR CULT: NORMAL

## 2023-04-11 ENCOUNTER — DOCUMENTATION (OUTPATIENT)
Dept: FAMILY MEDICINE CLINIC | Facility: CLINIC | Age: 38
End: 2023-04-11
Payer: COMMERCIAL

## 2023-04-11 ENCOUNTER — TELEPHONE (OUTPATIENT)
Dept: FAMILY MEDICINE CLINIC | Facility: CLINIC | Age: 38
End: 2023-04-11
Payer: COMMERCIAL

## 2023-04-11 NOTE — PROGRESS NOTES
Please call patient and let him know that his KUB was negative for stones - but KUB can only find radioopaque stones. In process of getting him in for a US of kidney

## 2023-04-11 NOTE — PROGRESS NOTES
Please call patient and let him know that his blood count and chemistries look good. Please also let him know that his urine culture was negative for any bacteria growing in his urine

## 2023-04-11 NOTE — TELEPHONE ENCOUNTER
Caller: Brandon Maciel    Relationship: Self    Best call back number [phone]    What test was performed: XR ABDOMEN KUB     When was the test performed: 04/10/2023    Where was the test performed: [facility]     Additional notes:   PATIENT WOULD LIKE A CALL BACK REGARDING THE RESULTS OF HIS XR DONE 04/10/2023    PATIENT STATED THAT HE WAS INFORMED BY [facility] THAT THEY DO NOT DO ULTRASOUNDS AND PATIENT WOULD LIKE A CALL BACK TO BE INFORMED ON WHERE HE CAN HAVE THE ULTRASOUND DONE AT     PATIENT STATED HE WAS INFORMED BY HIS JOB THAT HE WOULD NEED PROOF FROM HIS PCP THAT HE IS BEING CHECKED AND TREATED FOR KIDNEY STONES FOR PATIENT TO BE ABLE TO BE PLACED ON LIGHT WORK DUTY

## 2023-04-17 ENCOUNTER — TELEPHONE (OUTPATIENT)
Dept: FAMILY MEDICINE CLINIC | Facility: CLINIC | Age: 38
End: 2023-04-17
Payer: COMMERCIAL

## 2023-04-17 NOTE — TELEPHONE ENCOUNTER
Caller: Brandon Maciel    Relationship: Self    Best call back number: [phone]    What test was performed: ULTRASOUND    When was the test performed: 4/14/23    Where was the test performed: [facility]

## 2023-04-20 ENCOUNTER — OFFICE VISIT (OUTPATIENT)
Dept: FAMILY MEDICINE CLINIC | Facility: CLINIC | Age: 38
End: 2023-04-20
Payer: COMMERCIAL

## 2023-04-20 VITALS
DIASTOLIC BLOOD PRESSURE: 88 MMHG | SYSTOLIC BLOOD PRESSURE: 122 MMHG | BODY MASS INDEX: 26 KG/M2 | HEIGHT: 69 IN | HEART RATE: 96 BPM | OXYGEN SATURATION: 100 % | RESPIRATION RATE: 17 BRPM | WEIGHT: 175.56 LBS

## 2023-04-20 DIAGNOSIS — M54.50 ACUTE BILATERAL LOW BACK PAIN WITHOUT SCIATICA: Primary | ICD-10-CM

## 2023-04-20 PROCEDURE — 99213 OFFICE O/P EST LOW 20 MIN: CPT | Performed by: PHYSICIAN ASSISTANT

## 2023-04-20 RX ORDER — IBUPROFEN 600 MG/1
600 TABLET ORAL EVERY 8 HOURS PRN
Qty: 30 TABLET | Refills: 1 | Status: SHIPPED | OUTPATIENT
Start: 2023-04-20

## 2023-04-20 RX ORDER — PREDNISONE 20 MG/1
TABLET ORAL
Qty: 9 TABLET | Refills: 0 | Status: SHIPPED | OUTPATIENT
Start: 2023-04-20

## 2023-04-20 RX ORDER — CYCLOBENZAPRINE HCL 10 MG
10 TABLET ORAL 3 TIMES DAILY PRN
Qty: 90 TABLET | Refills: 0 | Status: SHIPPED | OUTPATIENT
Start: 2023-04-20

## 2023-04-20 NOTE — PROGRESS NOTES
".Chief Complaint  Back Pain (Patient has been unable to sleep due to his pain. States his pain is 8/10. He has been using Tylenol and Ibuprofen.)    Subjective          History of Present Illness  Brandon Maciel is here today with back pain.   He states that he took antibiotics after last visit and pain in upper back better. Since then he has started to have pain in his lower back that has moved up the middle of his spine. He states that he is finding a hard time finding a comfortable position to sleep in. 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 He denies any urinary or bowel incontinence. He has had no pain in  His leg or buttocks. He has had no pain radiating to leg or buttocks  He denies any dysuria or blood in urine.       Objective   Vital Signs:   /88 (BP Location: Left arm, Patient Position: Sitting, Cuff Size: Adult)   Pulse 96   Resp 17   Ht 175.3 cm (69\")   Wt 79.6 kg (175 lb 9 oz)   SpO2 100%   BMI 25.93 kg/m²     Body mass index is 25.93 kg/m².      Review of Systems      Current Outpatient Medications:   •  cyclobenzaprine (FLEXERIL) 10 MG tablet, Take 1 tablet by mouth 3 (Three) Times a Day As Needed for Muscle Spasms., Disp: 90 tablet, Rfl: 0  •  ibuprofen (ADVIL,MOTRIN) 600 MG tablet, Take 1 tablet by mouth Every 8 (Eight) Hours As Needed for Mild Pain., Disp: 30 tablet, Rfl: 1  •  predniSONE (DELTASONE) 20 MG tablet, 2 tab po qd x 3 days then 1 tab po qd x 3 days, Disp: 9 tablet, Rfl: 0    Allergies: Patient has no known allergies.    Physical Exam  Vitals and nursing note reviewed.   Constitutional:       Appearance: Normal appearance.   HENT:      Head: Atraumatic.   Pulmonary:      Effort: Pulmonary effort is normal.   Musculoskeletal:        Arms:    Neurological:      Mental Status: He is alert.          Result Review :             SCANNED - IMAGING (04/07/2023)  XR Abdomen KUB (In Office) (04/10/2023 10:01)         Assessment and " Plan    Diagnoses and all orders for this visit:    1. Acute bilateral low back pain without sciatica (Primary)  -     predniSONE (DELTASONE) 20 MG tablet; 2 tab po qd x 3 days then 1 tab po qd x 3 days  Dispense: 9 tablet; Refill: 0  -     cyclobenzaprine (FLEXERIL) 10 MG tablet; Take 1 tablet by mouth 3 (Three) Times a Day As Needed for Muscle Spasms.  Dispense: 90 tablet; Refill: 0  -     ibuprofen (ADVIL,MOTRIN) 600 MG tablet; Take 1 tablet by mouth Every 8 (Eight) Hours As Needed for Mild Pain.  Dispense: 30 tablet; Refill: 1    See treatment as above and will follow in a week for a recheck    Follow Up   Return in about 1 week (around 4/27/2023) for Recheck.  Patient was given instructions and counseling regarding his condition or for health maintenance advice. Please see specific information pulled into the AVS if appropriate.     ROSARIO Barth  04/20/2023

## 2023-10-20 ENCOUNTER — OFFICE VISIT (OUTPATIENT)
Dept: FAMILY MEDICINE CLINIC | Facility: CLINIC | Age: 38
End: 2023-10-20
Payer: COMMERCIAL

## 2023-10-20 VITALS
BODY MASS INDEX: 26.07 KG/M2 | WEIGHT: 176 LBS | HEART RATE: 91 BPM | DIASTOLIC BLOOD PRESSURE: 80 MMHG | SYSTOLIC BLOOD PRESSURE: 130 MMHG | OXYGEN SATURATION: 96 % | TEMPERATURE: 98.5 F | HEIGHT: 69 IN

## 2023-10-20 DIAGNOSIS — J06.9 VIRAL UPPER RESPIRATORY TRACT INFECTION: ICD-10-CM

## 2023-10-20 DIAGNOSIS — R05.1 ACUTE COUGH: Primary | ICD-10-CM

## 2023-10-20 LAB
EXPIRATION DATE: NORMAL
FLUAV AG UPPER RESP QL IA.RAPID: NOT DETECTED
FLUBV AG UPPER RESP QL IA.RAPID: NOT DETECTED
INTERNAL CONTROL: NORMAL
Lab: NORMAL
SARS-COV-2 AG UPPER RESP QL IA.RAPID: NOT DETECTED

## 2023-10-20 PROCEDURE — 87428 SARSCOV & INF VIR A&B AG IA: CPT | Performed by: FAMILY MEDICINE

## 2023-10-20 PROCEDURE — 99213 OFFICE O/P EST LOW 20 MIN: CPT | Performed by: FAMILY MEDICINE

## 2023-10-20 RX ORDER — DEXTROMETHORPHAN HYDROBROMIDE AND PROMETHAZINE HYDROCHLORIDE 15; 6.25 MG/5ML; MG/5ML
5 SYRUP ORAL 4 TIMES DAILY PRN
Qty: 120 ML | Refills: 0 | Status: SHIPPED | OUTPATIENT
Start: 2023-10-20

## 2023-10-20 RX ORDER — CHLORCYCLIZINE HYDROCHLORIDE AND PSEUDOEPHEDRINE HYDROCHLORIDE 25; 60 MG/1; MG/1
1 TABLET ORAL 2 TIMES DAILY
Qty: 30 TABLET | Refills: 0 | Status: SHIPPED | OUTPATIENT
Start: 2023-10-20

## 2023-10-20 NOTE — LETTER
October 20, 2023     Patient: Brandon Maciel   YOB: 1985   Date of Visit: 10/20/2023       To Whom It May Concern:    It is my medical opinion that Brandon Maciel may return to work in  4 days. He may return on Monday 10/23/2023.         Sincerely,        Winston Altman MD    CC: No Recipients

## 2023-10-21 NOTE — PROGRESS NOTES
Follow Up Office Visit      Date of Visit:  10/20/2023   Patient Name: Brandon Maciel  : 1985   MRN: [id number]     Chief Complaint:    Chief Complaint   Patient presents with    Fever     Cough, wife and mother both have been positive with the flu       History of Present Illness: Brandon Maciel is a 38 y.o. male who is here today for follow up.  Patient comes in with a 2 to 3-day history of body aches fevers and chills.  Patient with upper respiratory symptoms.  Positive exposure to flu        Subjective      Review of Systems:   Review of Systems   Constitutional:  Negative for fatigue and fever.   HENT:  Positive for rhinorrhea and sinus pressure. Negative for congestion and ear pain.    Respiratory:  Negative for apnea, cough, chest tightness and shortness of breath.    Cardiovascular:  Negative for chest pain.   Gastrointestinal:  Negative for abdominal pain, constipation, diarrhea and nausea.   Musculoskeletal:  Negative for arthralgias.   Psychiatric/Behavioral:  Negative for depressed mood and stress.        Past Medical History:   Past Medical History:   Diagnosis Date    Infectious viral hepatitis 2020    Found out at rehab treatment that i have Hepatitis C and its active    Testosterone deficiency        Past Surgical History:   Past Surgical History:   Procedure Laterality Date    COLONOSCOPY  2022       Family History:   Family History   Problem Relation Age of Onset    Alcohol abuse Mother     Alcohol abuse Father     Heart disease Maternal Uncle     Stroke Maternal Uncle     Colon cancer Neg Hx     Colon polyps Neg Hx     Esophageal cancer Neg Hx        Social History:   Social History     Socioeconomic History    Marital status: Single   Tobacco Use    Smoking status: Every Day     Packs/day: 1.50     Years: 15.00     Additional pack years: 0.00     Total pack years: 22.50     Types: Cigarettes    Smokeless tobacco: Never   Vaping Use    Vaping Use: Never used  "  Substance and Sexual Activity    Alcohol use: Never    Drug use: Yes     Frequency: 7.0 times per week     Types: Marijuana    Sexual activity: Yes     Partners: Female     Birth control/protection: Condom, I.U.D.       Medications:     Current Outpatient Medications:     Chlorcyclizine-Pseudoephed (Stahist AD) 25-60 MG tablet, Take 1 each by mouth 2 (Two) Times a Day., Disp: 30 tablet, Rfl: 0    cyclobenzaprine (FLEXERIL) 10 MG tablet, Take 1 tablet by mouth 3 (Three) Times a Day As Needed for Muscle Spasms. (Patient not taking: Reported on 10/20/2023), Disp: 90 tablet, Rfl: 0    ibuprofen (ADVIL,MOTRIN) 600 MG tablet, Take 1 tablet by mouth Every 8 (Eight) Hours As Needed for Mild Pain. (Patient not taking: Reported on 10/20/2023), Disp: 30 tablet, Rfl: 1    predniSONE (DELTASONE) 20 MG tablet, 2 tab po qd x 3 days then 1 tab po qd x 3 days (Patient not taking: Reported on 10/20/2023), Disp: 9 tablet, Rfl: 0    promethazine-dextromethorphan (PROMETHAZINE-DM) 6.25-15 MG/5ML syrup, Take 5 mL by mouth 4 (Four) Times a Day As Needed for Cough., Disp: 120 mL, Rfl: 0    Allergies:   No Known Allergies    Objective     Physical Exam:  Vital Signs:   Vitals:    10/20/23 1427   BP: 130/80   Pulse: 91   Temp: 98.5 °F (36.9 °C)   SpO2: 96%   Weight: 79.8 kg (176 lb)   Height: 175.3 cm (69\")     Body mass index is 25.99 kg/m².     Physical Exam  Vitals and nursing note reviewed.   Constitutional:       General: He is not in acute distress.     Appearance: Normal appearance. He is not ill-appearing.   HENT:      Head: Normocephalic and atraumatic.      Right Ear: Tympanic membrane and ear canal normal.      Left Ear: Tympanic membrane and ear canal normal.      Nose: Nose normal.   Cardiovascular:      Rate and Rhythm: Normal rate and regular rhythm.      Heart sounds: Normal heart sounds.   Pulmonary:      Effort: Pulmonary effort is normal.      Breath sounds: Normal breath sounds.   Neurological:      Mental Status: He is " alert and oriented to person, place, and time. Mental status is at baseline.   Psychiatric:         Mood and Affect: Mood normal.         Procedures      Assessment / Plan      Assessment/Plan:   Diagnoses and all orders for this visit:    1. Acute cough (Primary)  -     POCT SARS-CoV-2 Antigen GABBIE + Flu    2. Viral upper respiratory tract infection  -     promethazine-dextromethorphan (PROMETHAZINE-DM) 6.25-15 MG/5ML syrup; Take 5 mL by mouth 4 (Four) Times a Day As Needed for Cough.  Dispense: 120 mL; Refill: 0  -     Chlorcyclizine-Pseudoephed (Stahist AD) 25-60 MG tablet; Take 1 each by mouth 2 (Two) Times a Day.  Dispense: 30 tablet; Refill: 0         Flu and COVID swab negative.  Symptomatic and supportive care.    Follow Up:   No follow-ups on file.    Winston Altman  [facility]